# Patient Record
Sex: MALE | Race: WHITE | Employment: UNEMPLOYED | URBAN - METROPOLITAN AREA
[De-identification: names, ages, dates, MRNs, and addresses within clinical notes are randomized per-mention and may not be internally consistent; named-entity substitution may affect disease eponyms.]

---
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
## 2024-01-22 NOTE — LETTER
12/23/19 Patient: Marbin Walker YOB: 2005 Date of Visit: 12/23/2019 Kay Edmonds MD 
[address] [facility] [address] [facility] [address] VIA Facsimile: [phone] Dear Kay Edmonds MD, Thank you for referring Mr. Roselyn Burton to [address] [place] [address] for evaluation. My notes for this consultation are attached. Chief Complaint Patient presents with  Follow-up  Weight Management Per mother, pt is having oral surgery Friday. Per mother, pt appetite has decreased and is concerned about nurtition. [address] 
[address] [facility] [address] [facility], [address] 
[phone] Subjective: Follow up for elevated triglycerides Elevated ALT Abnormal weight gain History of present illness: 
Kristian Caldera is a 12  y.o. 6  m.o. male who has been followed in endocrine clinic since 4/2017 for elevated TG. He was present today with his mother. Initial labs done by PMD had TG of 304mg/dl, total Chol of 179, HDL of 26 and LDL of 61. Started omega -3 fish oil in 4/2017. Most recent labs in 1/2018 had TG of 201mg/dl, Total chol of 185, LDL of 114, HDL of 31mg/dl. Labs done on 2/28/2019 significant for improved lipid panel with total cholesterol 154, triglycerides of 207, LDL of 87, low HDL of 26. His ALT had improved to 35. Results for Cassidy Kirk (MRN [id number]) as of 12/23/2019 15:59 Ref. Range 7/22/2017 07:52 1/13/2018 07:40 1/13/2018 07:41 10/23/2018 07:32 2/28/2019 08:12  
ALT (SGPT) Latest Ref Range: 0 - 30 IU/L 60 (H)  48 (H) 61 (H) 35 (H) AST Latest Ref Range: 0 - 40 IU/L 36  32 38 24 Alk. phosphatase Latest Ref Range: 84 - 254 IU/L 193  174 226 181 Triglyceride Latest Ref Range: 0 - 89 mg/dL 234 (H)  201 (H) 170 (H) 207 (H) Cholesterol, total Latest Ref Range: 100 - 169 mg/dL 192 (H)  185 (H) 132 154 Progress Note     KELLE DUGAN MD   [address]  [place] [address]     PATIENT NAME: Latha Palm  : 1982  DATE: 24  MRN: [id number]      Billing Provider: KELLE DUGAN MD  Level of Service: UT OFFICE/OUTPT VISIT, ADAIR MATUTE IV, 45-59 MIN  Patient PCP Information       Provider PCP Type    Primary Doctor No General                Rash (Patient reports that she has been getting a rash randomly on her torso, arms and head. Patient states that rash looks like large red welts. Patient does have small area on breast, but she says it is usually much worse. /Patient was broke out with rash all last week, but she has since taken allegra and states it has gotten better. )      SUBJECTIVE:     Latha Palm is a 41 y.o.female who presents to clinic for Rash (Patient reports that she has been getting a rash randomly on her torso, arms and head. Patient states that rash looks like large red welts. Patient does have small area on breast, but she says it is usually much worse. /Patient was broke out with rash all last week, but she has since taken allegra and states it has gotten better. )    Patient presents to clinic today for establishment of care and to discuss intermittent rash.    Patient has known history of environmental allergies and alpha gal allergy.  Patient was previously prescribed Zyrtec, Allegra, and Singulair.  Patient has been evaluated by an allergist in [facility].  She states she would numerous environmental allergies after allergy testing.  She was also noted to be allergic to alpha gal. patient continues to eat dairy products but does try to avoid mammalian meat.  Patient states that the rash will occur intermittently.  Patient is not currently have a rash.  She states when it does occur it is red and itchy.  Her allergist told her in the past that it was due to her alpha gal allergy that she was developing this rash.  She has been off  HDL Cholesterol Latest Ref Range: >39 mg/dL 34 (L)  31 (L) 27 (L) 26 (L) VLDL, calculated Latest Ref Range: 5 - 40 mg/dL 47 (H)  40 34 41 (H) LDL, calculated Latest Ref Range: 0 - 109 mg/dL 111 (H)  114 (H) 71 87 His last visit in endocrine clinic was on 3/5/2019. Since then, he has been in good health, with no significant illnesses. Denies headache,tiredness, problems with peripheral vision,constipation/diarrhea,heat/cold intolerance,polyuria,polydipsia,muscle aches, joint pain,chest pain, palpitations Changes: 
Sugary drinks: decreased Activity: Walking more Carbs: Decreased Past Medical History:  
Diagnosis Date  Otitis media Social History: No interval change Review of Systems: A comprehensive review of systems was negative except for that written in the HPI. Medications: 
Current Outpatient Medications Medication Sig  
 fluticasone (FLONASE) 50 mcg/actuation nasal spray 2 Sprays by Both Nostrils route daily.  loratadine (CLARITIN) 10 mg tablet Take 10 mg by mouth.  mometasone (NASONEX) 50 mcg/Actuation nasal spray 2 Sprays by Nasal route daily. Indications: ALLERGIC RHINITIS  vitamin E (AQUA GEMS) 400 unit capsule Take  by mouth daily.  Omega-3-DHA-EPA-Fish Oil 1,000 mg (120 mg-180 mg) cap Take  by mouth.  FIBER, CALCIUM POLYCARBOPHIL, PO Take  by mouth. No current facility-administered medications for this visit. Allergies: Allergies Allergen Reactions  Augmentin [Amoxicillin-Pot Clavulanate] Anaphylaxis  Augmentin [Amoxicillin-Pot Clavulanate] Rash  Keflex [Cephalexin] Rash  Keflex [Cephalexin] Rash Objective:  
 
 
Visit Vitals /82 (BP 1 Location: Left arm, BP Patient Position: Sitting) Pulse 74 Temp 98.5 °F (36.9 °C) (Oral) Resp 19 Ht 6' 4.58\" (1.945 m) Wt 261 lb 12.8 oz (118.8 kg) SpO2 98% BMI 31.39 kg/m² Height: >99 %ile (Z= 3.01) based on CDC (Boys, 2-20 Years) Stature-for-age of her Zyrtec and Singulair for some time.  She has been taking some over-the-counter Allegra.    Patient also has known history of anxiety/depression.  Patient was previously prescribed Lexapro.  Patient has been off this medication for the past year.  Patient was having some depressive symptoms.  She has associated fatigue and insomnia with racing thoughts preventing her from sleeping.  She also has anhedonia.  She denies any SI.  Patient amenable to restarting Lexapro.    Patient was prescribed Elavil for both depression and migraine prevention.  Patient gets migraines almost daily per her report.  Patient notes that Elavil offered significant relief in the past.  She would like to restart this medication.    Patient has a history of type 2 diabetes.  Patient states she was diagnosed at the age of 13.  Patient has not been on any of her diabetic medications for the past year.  Patient was previously prescribed Ozempic, Jardiance, and insulin.  Patient does not monitor her blood glucose levels.  Patient states that her blood sugar was well-controlled proximally 1 year ago.  Patient also has a history of microalbuminuria for which she was prescribed lisinopril.  Patient has not seen an eye doctor in over a year.  Patient amenable to having referral placed.    Patient also has a history of GERD and gastric ulcer.  Patient was previously prescribed Pepcid and Nexium.  Patient states that she has occasional hematemesis.  Her last episode was 1 month ago.  Patient has been off of her medications for the past year.  She notes that she gets frequent GERD symptoms.  She was amenable to being referred to GI.    Patient also has a history of fatty liver.  Patient takes Lipitor.  Patient denies any history of cirrhosis.    Patient also has chronic low back pain.  Patient previously prescribed gabapentin.  Patient would like to continue this medication.    Patient has a prior history of an enlarged thyroid.  Patient states  data based on Stature recorded on 12/23/2019. Weight: >99 %ile (Z= 3.01) based on CDC (Boys, 2-20 Years) weight-for-age data using vitals from 12/23/2019. BMI: Body mass index is 31.39 kg/m². Percentile: 98 %ile (Z= 2.12) based on CDC (Boys, 2-20 Years) BMI-for-age based on BMI available as of 12/23/2019. Change in height: + 5.2 cm in 9mons Change in weight: +4.2 kg in 9mons In general, Carol Pérez is alert, well-appearing and in no acute distress. HEENT: normocephalic, atraumatic. Pupils are equal, round and reactive to light. Extraocular movements are intact, fundi are sharp bilaterally. Dentition is appropriate for age. Oropharynx is clear, mucous membranes moist. Neck is supple without lymphadenopathy. Thyroid is smooth and not enlarged. Chest: Clear to auscultation bilaterally. CV: Normal S1/S2 without murmur. Abdomen is soft, nontender, nondistended, no hepatosplenomegaly. Skin is warm, without rash or macules. Extremities are within normal. Neuro demonstrates 2+ patellar reflexes bilaterally. Sexual development: stage deferred Laboratory data: 
Results for orders placed or performed in visit on 03/01/19 LIPID PANEL Result Value Ref Range Cholesterol, total 154 100 - 169 mg/dL Triglyceride 207 (H) 0 - 89 mg/dL HDL Cholesterol 26 (L) >39 mg/dL VLDL, calculated 41 (H) 5 - 40 mg/dL LDL, calculated 87 0 - 109 mg/dL METABOLIC PANEL, COMPREHENSIVE Result Value Ref Range Glucose 96 65 - 99 mg/dL BUN 10 5 - 18 mg/dL Creatinine 0.63 (L) 0.76 - 1.27 mg/dL GFR est non-AA CANCELED mL/min/1.73 GFR est AA CANCELED mL/min/1.73  
 BUN/Creatinine ratio 16 10 - 22 Sodium 141 134 - 144 mmol/L Potassium 4.7 3.5 - 5.2 mmol/L Chloride 104 96 - 106 mmol/L  
 CO2 24 20 - 29 mmol/L Calcium 9.8 8.9 - 10.4 mg/dL Protein, total 6.6 6.0 - 8.5 g/dL Albumin 4.3 3.5 - 5.5 g/dL GLOBULIN, TOTAL 2.3 1.5 - 4.5 g/dL A-G Ratio 1.9 1.2 - 2.2 that they monitored her thyroid routinely at her former primary care clinic with yearly ultrasound and TSH.    Patient is due for mammogram.  She denies any family history of colon cancer.  Patient has a history of cervical cancer in his status post hysterectomy in .  Patient states that she was not seen OBGYN since that time.  Patient also states that she has not had a vaginal cuff smear since her hysterectomy.    Was seeing Dr. Ac, [facility]. Saw some one at [facility] earlier in the year.     All other pertinent review of systems negative. Please see HPI for details.     Past Medical History:  has a past medical history of Asthma, Diabetes mellitus, Hypertension, Neuropathy, Scoliosis, and Unspecified osteoarthritis, unspecified site.   Past Surgical History:  has a past surgical history that includes Hysterectomy and  section.  Family History: family history is not on file.  Social History:  reports that she has quit smoking. Her smoking use included cigarettes. She started smoking about 25 years ago. She has been exposed to tobacco smoke. She has never used smokeless tobacco. She reports that she does not currently use alcohol. She reports that she does not use drugs.  Allergies:   Review of patient's allergies indicates:   Allergen Reactions    Clindamycin Anaphylaxis    Penicillins Swelling    Amoxicillin     Bactrim [sulfamethoxazole-trimethoprim]     Cyclobenzaprine     Morphine     Opioids - morphine analogues     Promethazine     Toradol [ketorolac]     Tramadol          Current Outpatient Medications:     budesonide-formoterol 160-4.5 mcg (SYMBICORT) 160-4.5 mcg/actuation HFAA, Inhale 2 puffs into the lungs every 12 (twelve) hours. Controller, Disp: , Rfl:     insulin aspart protamine-insulin aspart (NOVOLOG 70/30) 100 unit/mL (70-30) InPn pen, Inject 20 Units into the skin 3 (three) times daily before meals., Disp: , Rfl:     insulin detemir U-100 (LEVEMIR) 100 unit/mL  "injection, Inject 30 Units into the skin every evening., Disp: , Rfl:     amitriptyline (ELAVIL) 10 MG tablet, Take 1 tablet (10 mg total) by mouth every evening., Disp: 90 tablet, Rfl: 0    atorvastatin (LIPITOR) 10 MG tablet, Take 1 tablet (10 mg total) by mouth once daily., Disp: 90 tablet, Rfl: 0    cetirizine (ZYRTEC) 10 MG tablet, Take 1 tablet (10 mg total) by mouth once daily., Disp: 90 tablet, Rfl: 0    empagliflozin (JARDIANCE) 25 mg tablet, Take 1 tablet (25 mg total) by mouth once daily., Disp: 90 tablet, Rfl: 0    EScitalopram oxalate (LEXAPRO) 20 MG tablet, Take 1 tablet (20 mg total) by mouth once daily., Disp: 90 tablet, Rfl: 0    esomeprazole (NEXIUM) 40 mg GrPS, Take 40 mg by mouth before breakfast., Disp: 90 each, Rfl: 1    famotidine (PEPCID) 40 MG tablet, Take 1 tablet (40 mg total) by mouth once daily., Disp: 90 tablet, Rfl: 1    fexofenadine (ALLEGRA) 180 MG tablet, Take 1 tablet (180 mg total) by mouth once daily., Disp: 90 tablet, Rfl: 0    gabapentin (NEURONTIN) 400 MG capsule, Take 1 capsule (400 mg total) by mouth 3 (three) times daily., Disp: 270 capsule, Rfl: 0    lisinopriL (PRINIVIL,ZESTRIL) 5 MG tablet, Take 1 tablet (5 mg total) by mouth once daily., Disp: 90 tablet, Rfl: 1    montelukast (SINGULAIR) 10 mg tablet, Take 1 tablet (10 mg total) by mouth every evening., Disp: 90 tablet, Rfl: 0    semaglutide (OZEMPIC) 0.25 mg or 0.5 mg (2 mg/3 mL) pen injector, Inject 0.5 mg into the skin every 7 days., Disp: 3 mL, Rfl: 0   OBJECTIVE:     Vital Signs   /84 (BP Location: Right arm, Patient Position: Sitting, BP Method: Large (Manual))   Pulse 103   Temp 98.1 °F (36.7 °C) (Temporal)   Resp 18   Ht 5' 5" (1.651 m)   Wt 93.1 kg (205 lb 3.2 oz)   SpO2 97%   BMI 34.15 kg/m²     Physical Exam  Constitutional:       General: She is not in acute distress.     Appearance: Normal appearance. She is not ill-appearing, toxic-appearing or diaphoretic.   HENT:      Head: Normocephalic and " Bilirubin, total 0.5 0.0 - 1.2 mg/dL Alk. phosphatase 181 84 - 254 IU/L  
 AST (SGOT) 24 0 - 40 IU/L  
 ALT (SGPT) 35 (H) 0 - 30 IU/L  
CVD REPORT Result Value Ref Range INTERPRETATION Note Assessment:  
 
 
Power Munguia is a 12  y.o. 6  m.o. male presenting for follow up of abnormal weight gain /elevated ALT /hypertriglyceridemia. He has been in good health since his last visit. Made some healthy dietary and lifestyle changes: Interval decrease in BMI. Most recent labs show improvement in lipid profile. Normal total Chol, normal LDL, elevated TG ,low HDL. We reviewed the complications of hypertriglyceridemia and the importance of compliance with management. Decrease carbs, decrease sugary drinks and increase activity. They met with dietician today Elevated ALT: Most recent labs done on February 28, 2019 showed significant improvement in ALT. We encouraged him to continue with dietary and lifestyle changes to help normalize levels. Decrease sugary drinks, decrease carbs, increase activity. We will send repeat labs today: Lipid panel and hepatic panel. Will call family to discuss the results. Follow-up in clinic in 4 months or sooner if any concerns. Plan:  
Reviewed growth charts with the family Diagnosis, etiology, pathophysiology, risk/ benefits of rx, proposed eval, and expected follow up discussed with family and all questions answered We reviewed the complications of hypertriglyceridemia and the importance of compliance with management. We stressed the need to reduce sugary drinks. ,increase activity Would repeat labs:fasting sample before next visit Would call family with results and further management plan Follow up in 4months or sooner if any concerns. Orders Placed This Encounter  LIPID PANEL  
 HEPATIC FUNCTION PANEL Total time: 40minutes Time spent counseling patient/family: 50% Patient verbalized ok for mother to have full access to [facility]. If you have questions, please do not hesitate to call me. I look forward to following your patient along with you.  
 
 
Sincerely, 
 
Srikanth Parra MD 
 
 
 atraumatic.      Right Ear: Tympanic membrane normal.      Left Ear: Tympanic membrane normal.      Nose: No congestion or rhinorrhea.      Mouth/Throat:      Mouth: Mucous membranes are moist.      Pharynx: No oropharyngeal exudate or posterior oropharyngeal erythema.   Eyes:      Extraocular Movements: Extraocular movements intact.      Pupils: Pupils are equal, round, and reactive to light.   Neck:      Comments: Mildly enlarged thyroid.  Cardiovascular:      Rate and Rhythm: Normal rate and regular rhythm.      Pulses: Normal pulses.           Dorsalis pedis pulses are 2+ on the right side and 2+ on the left side.        Posterior tibial pulses are 2+ on the right side and 2+ on the left side.      Heart sounds: Normal heart sounds. No murmur heard.     No friction rub. No gallop.   Pulmonary:      Effort: Pulmonary effort is normal. No respiratory distress.      Breath sounds: No wheezing, rhonchi or rales.   Abdominal:      General: Abdomen is flat.      Palpations: Abdomen is soft.      Tenderness: There is abdominal tenderness (Mild epigastric abdominal tenderness). There is no guarding or rebound.   Musculoskeletal:         General: Normal range of motion.      Cervical back: Normal range of motion.      Right foot: Normal range of motion. No deformity.      Left foot: Normal range of motion. No deformity.   Feet:      Right foot:      Protective Sensation: 10 sites tested.  0 sites sensed.      Skin integrity: Skin integrity normal.      Toenail Condition: Right toenails are abnormally thick.      Left foot:      Protective Sensation: 10 sites tested.  0 sites sensed.      Skin integrity: Skin integrity normal.      Toenail Condition: Left toenails are abnormally thick.   Skin:     General: Skin is warm and dry.      Capillary Refill: Capillary refill takes less than 2 seconds.   Neurological:      General: No focal deficit present.      Mental Status: She is alert.   Psychiatric:         Mood and Affect:  Mood normal.         Behavior: Behavior normal.         ASSESSMENT/PLAN:     1. Type 2 diabetes mellitus with hyperglycemia, unspecified whether long term insulin use  -     lisinopriL (PRINIVIL,ZESTRIL) 5 MG tablet; Take 1 tablet (5 mg total) by mouth once daily.  Dispense: 90 tablet; Refill: 1  -     atorvastatin (LIPITOR) 10 MG tablet; Take 1 tablet (10 mg total) by mouth once daily.  Dispense: 90 tablet; Refill: 0  -     CBC Auto Differential; Future; Expected date: 01/22/2024  -     Comprehensive Metabolic Panel; Future; Expected date: 01/22/2024  -     Lipid Panel; Future; Expected date: 01/22/2024  -     Microalbumin/Creatinine Ratio, Urine  -     Hemoglobin A1C; Future; Expected date: 01/22/2024  -     empagliflozin (JARDIANCE) 25 mg tablet; Take 1 tablet (25 mg total) by mouth once daily.  Dispense: 90 tablet; Refill: 0  -     semaglutide (OZEMPIC) 0.25 mg or 0.5 mg (2 mg/3 mL) pen injector; Inject 0.5 mg into the skin every 7 days.  Dispense: 3 mL; Refill: 0  -     Ambulatory referral/consult to Optometry; Future; Expected date: 01/30/2024    Patient due for diabetic eye exam.  We will refer.  Patient's hemoglobin A1c greater than 13 at this time.  We will have patient restart Ozempic and Jardiance.  We will titrate Ozempic up to max dose over the next few months.  If patient's blood glucose levels are still elevated after maximum titration, we will add additional medications.  Patient with a history of microalbuminuria.  Patient to continue lisinopril.  Lipitor refill also provided.  Foot exam performed today.    2. Anxiety and depression  -     EScitalopram oxalate (LEXAPRO) 20 MG tablet; Take 1 tablet (20 mg total) by mouth once daily.  Dispense: 90 tablet; Refill: 0    Patient has known history of anxiety/depression.  Patient has been off of her Lexapro for some time.  We will provide refill for Lexapro.  Patient to restart medication.  Counseled to take half tablet for the 1st 2-3 weeks followed by  whole tablet.  Patient to follow up in 2 weeks for recheck.    3. Allergy to alpha-gal    4. Environmental allergies  -     cetirizine (ZYRTEC) 10 MG tablet; Take 1 tablet (10 mg total) by mouth once daily.  Dispense: 90 tablet; Refill: 0  -     montelukast (SINGULAIR) 10 mg tablet; Take 1 tablet (10 mg total) by mouth every evening.  Dispense: 90 tablet; Refill: 0  -     fexofenadine (ALLEGRA) 180 MG tablet; Take 1 tablet (180 mg total) by mouth once daily.  Dispense: 90 tablet; Refill: 0    Patient has significant history of environmental allergies as well as allergy to alpha gout.  Patient has been evaluated by Allergy and immunology in the past.  Patient to restart Zyrtec, Singulair and continue Allegra.  Advised importance of avoidance techniques in regards to rash related to her alpha Gal. discussed that any mammalian products can result in a rash secondary to allergic reaction.  Patient counseled on avoidance techniques.  If rash persists, can refer to Allergy/immunology for further evaluation.    5. Enlarged thyroid  -     TSH; Future; Expected date: 01/22/2024  -      Thyroid; Future; Expected date: 01/22/2024    Patient with a history of enlarged thyroid.  We will obtain thyroid ultrasound.    6. Gastroesophageal reflux disease, unspecified whether esophagitis present  -     Ambulatory referral/consult to Gastroenterology; Future; Expected date: 01/29/2024    7. History of gastric ulcer  -     esomeprazole (NEXIUM) 40 mg GrPS; Take 40 mg by mouth before breakfast.  Dispense: 90 each; Refill: 1  -     famotidine (PEPCID) 40 MG tablet; Take 1 tablet (40 mg total) by mouth once daily.  Dispense: 90 tablet; Refill: 1  -     Ambulatory referral/consult to Gastroenterology; Future; Expected date: 01/29/2024    Patient with history of GERD.  Patient also has a history of gastric ulcer.  Patient is having symptoms.  Patient has occasional hematemesis.  Patient does have some epigastric pain on exam.  Patient  restart Nexium and Pepcid.  We will refer to GI for further evaluation.  Records have been requested.    8. Fatty liver  -     Ambulatory referral/consult to Gastroenterology; Future; Expected date: 01/29/2024    Patient with fatty liver.  Records requested.  Referring to GI.  CMP ordered.    9. Chronic midline low back pain with bilateral sciatica  -     Discontinue: gabapentin (NEURONTIN) 400 MG capsule; Take 1 capsule (400 mg total) by mouth 3 (three) times daily. 2 caps am and noon  3 caps at bedtime  Dispense: 90 capsule; Refill: 0  -     gabapentin (NEURONTIN) 400 MG capsule; Take 1 capsule (400 mg total) by mouth 3 (three) times daily.  Dispense: 270 capsule; Refill: 0    Patient with a history of chronic low back pain.  Refill for gabapentin provided.    10. Migraine without status migrainosus, not intractable, unspecified migraine type  -     amitriptyline (ELAVIL) 10 MG tablet; Take 1 tablet (10 mg total) by mouth every evening.  Dispense: 90 tablet; Refill: 0    Patient with a history of migraines.  Patient previously prescribed Elavil for migraine prophylaxis.  We will refill.    11. History of cervical cancer  -     Ambulatory referral/consult to Obstetrics / Gynecology; Future; Expected date: 01/29/2024    Patient with history of cervical cancer.  Patient is status post hysterectomy.  Patient has not had a vaginal cuff smear since her hysterectomy.  We will refer to OBGYN for gynecologic exam.    12. Encounter for screening for HIV  -     HIV 1/2 Ag/Ab (4th Gen); Future; Expected date: 01/22/2024  Patient requesting HIV and hepatitis-C screen.  We will obtain.    13. Encounter for screening mammogram for malignant neoplasm of breast  -     Mammo Digital Screening Bilat; Future; Expected date: 01/22/2024  Patient due for mammogram.  We will order.    14. Need for hepatitis C screening test  -     Hepatitis C Antibody; Future; Expected date: 01/22/2024        Follow up in about 2 weeks (around 2/5/2024)  for Mila.      KELLE DUGAN MD  01/23/2024    Due to voice recognition software, sound alike and misspelled words may be contained in the documentation.

## (undated) DEVICE — STERILE POLYISOPRENE POWDER-FREE SURGICAL GLOVES: Brand: PROTEXIS

## (undated) DEVICE — COVER LT HNDL PLAS RIG 1 PER PK

## (undated) DEVICE — SUTURE MCRYL SZ 4 0 L18IN ABSRB UD PC 5 L19MM 3 8 CIR SGL Y823G

## (undated) DEVICE — INFECTION CONTROL KIT SYS

## (undated) DEVICE — PENCIL ES L3M BTTN SWCH S STL HEX LOK BLDE ELECTRD HOLSTER

## (undated) DEVICE — SYR 10ML LUER LOK 1/5ML GRAD --

## (undated) DEVICE — SURGICAL PROCEDURE PACK BASIN MAJ SET CUST NO CAUT

## (undated) DEVICE — PREP SKN CHLRAPRP APL 26ML STR --

## (undated) DEVICE — BANDAGE,ELASTIC,ESMARK,STERILE,4"X9',LF: Brand: MEDLINE

## (undated) DEVICE — GOWN,SIRUS,NONRNF,SETINSLV,2XL,18/CS: Brand: MEDLINE

## (undated) DEVICE — NEEDLE HYPO 22GA L1.5IN BLK S STL HUB POLYPR SHLD REG BVL

## (undated) DEVICE — SUTURE VCRL SZ 2-0 L27IN ABSRB UD L26MM SH 1/2 CIR J417H

## (undated) DEVICE — PACK,BASIC,SIRUS,V: Brand: MEDLINE

## (undated) DEVICE — REM POLYHESIVE ADULT PATIENT RETURN ELECTRODE: Brand: VALLEYLAB

## (undated) DEVICE — STRAP,POSITIONING,KNEE/BODY,FOAM,4X60": Brand: MEDLINE

## (undated) DEVICE — DRAPE,EXTREMITY,89X128,STERILE: Brand: MEDLINE

## (undated) DEVICE — TUBING SUCT 10FR MAL ALUM SHFT FN CAP VENT UNIV CONN W/ OBT

## (undated) DEVICE — SOLUTION IV 1000ML 0.9% SOD CHL

## (undated) DEVICE — STRIP,CLOSURE,WOUND,MEDI-STRIP,1/2X4: Brand: MEDLINE

## (undated) DEVICE — PADDING CAST 4 INX5 YD STRL

## (undated) DEVICE — DRESSING,GAUZE,XEROFORM,CURAD,1"X8",ST: Brand: CURAD

## (undated) DEVICE — PADDING CAST 3 INX4 YD STRL

## (undated) DEVICE — ZIMMER® STERILE DISPOSABLE TOURNIQUET CUFF WITH PLC, DUAL PORT, SINGLE BLADDER, 24 IN. (61 CM)